# Patient Record
Sex: FEMALE | Race: WHITE | Employment: OTHER | URBAN - METROPOLITAN AREA
[De-identification: names, ages, dates, MRNs, and addresses within clinical notes are randomized per-mention and may not be internally consistent; named-entity substitution may affect disease eponyms.]

---

## 2020-01-18 ENCOUNTER — HOSPITAL ENCOUNTER (OUTPATIENT)
Age: 45
Discharge: HOME | End: 2020-01-18
Attending: FAMILY MEDICINE
Payer: COMMERCIAL

## 2020-01-18 VITALS
RESPIRATION RATE: 18 BRPM | HEART RATE: 81 BPM | OXYGEN SATURATION: 98 % | TEMPERATURE: 99 F | DIASTOLIC BLOOD PRESSURE: 67 MMHG | SYSTOLIC BLOOD PRESSURE: 112 MMHG

## 2020-01-18 DIAGNOSIS — Z20.818 STREPTOCOCCUS GROUP A EXPOSURE: Primary | ICD-10-CM

## 2020-01-18 DIAGNOSIS — R68.89 FLU-LIKE SYMPTOMS: ICD-10-CM

## 2020-01-18 LAB
POCT INFLUENZA A: NEGATIVE
POCT INFLUENZA B: NEGATIVE

## 2020-01-18 PROCEDURE — 87502 INFLUENZA DNA AMP PROBE: CPT | Performed by: FAMILY MEDICINE

## 2020-01-18 PROCEDURE — 99204 OFFICE O/P NEW MOD 45 MIN: CPT

## 2020-01-18 PROCEDURE — 87430 STREP A AG IA: CPT

## 2020-01-18 PROCEDURE — 99203 OFFICE O/P NEW LOW 30 MIN: CPT

## 2020-01-18 RX ORDER — FLUCONAZOLE 150 MG/1
150 TABLET ORAL ONCE
Qty: 1 TABLET | Refills: 0 | Status: SHIPPED | OUTPATIENT
Start: 2020-01-18 | End: 2020-01-18

## 2020-01-18 RX ORDER — AZITHROMYCIN 250 MG/1
TABLET, FILM COATED ORAL
Qty: 1 PACKAGE | Refills: 0 | Status: SHIPPED | OUTPATIENT
Start: 2020-01-18 | End: 2020-01-23

## 2020-01-18 NOTE — ED INITIAL ASSESSMENT (HPI)
Pt states son was sick last weekend with fever as high as 103. Pt son was diagnosed with strep. Pt states 2 nights ago began having a scratchy throat and a slight cough. Pt states began feeling fatigued and having fevers.

## 2020-01-18 NOTE — ED PROVIDER NOTES
Patient presents with:  Sore Throat  Fever      HPI:     Rolan Bhatti is a 38year old female who presents with for chief complaint of nasal congestion, sore throat, fever, fatigue. Started Thursday night. Son diagnosed with strep last week.    The p pulses. Exam reveals no gallop and no friction rub. No murmur heard. 4.RESPIRATORY/Pulmonary/Chest: Effort normal and breath sounds normal. No stridor. No respiratory distress. no wheezes. no rales. 5. GI: Abdominal: Soft.  Bowel sounds are normal. BETTER

## 2020-01-20 LAB — S PYO AG THROAT QL: NEGATIVE

## 2021-12-22 ENCOUNTER — IMMUNIZATION (OUTPATIENT)
Dept: LAB | Facility: HOSPITAL | Age: 46
End: 2021-12-22
Attending: EMERGENCY MEDICINE
Payer: COMMERCIAL

## 2021-12-22 DIAGNOSIS — Z23 NEED FOR VACCINATION: Primary | ICD-10-CM

## 2021-12-22 PROCEDURE — 0064A SARSCOV2 VAC 50MCG/0.25ML IM: CPT

## 2023-03-22 ENCOUNTER — HOSPITAL ENCOUNTER (OUTPATIENT)
Age: 48
Discharge: HOME | End: 2023-03-22
Payer: COMMERCIAL

## 2023-03-22 VITALS
OXYGEN SATURATION: 100 % | DIASTOLIC BLOOD PRESSURE: 56 MMHG | TEMPERATURE: 98 F | RESPIRATION RATE: 18 BRPM | SYSTOLIC BLOOD PRESSURE: 114 MMHG | HEART RATE: 81 BPM

## 2023-03-22 DIAGNOSIS — U07.1 COVID-19: Primary | ICD-10-CM

## 2023-03-22 DIAGNOSIS — R07.0 THROAT PAIN: ICD-10-CM

## 2023-03-22 DIAGNOSIS — Z20.818 EXPOSURE TO STREP THROAT: ICD-10-CM

## 2023-03-22 LAB — S PYO AG THROAT QL: NEGATIVE

## 2023-03-22 PROCEDURE — 99203 OFFICE O/P NEW LOW 30 MIN: CPT | Performed by: NURSE PRACTITIONER

## 2023-03-22 PROCEDURE — 87880 STREP A ASSAY W/OPTIC: CPT | Performed by: NURSE PRACTITIONER

## 2023-03-22 NOTE — ED INITIAL ASSESSMENT (HPI)
Pt here for strep testing due to exposure, pt states her son tested +strep x1 day ,pt also states she tested +covid x1 day, pt denies any sob

## 2023-12-06 ENCOUNTER — HOSPITAL ENCOUNTER (OUTPATIENT)
Age: 48
Discharge: HOME | End: 2023-12-06
Payer: COMMERCIAL

## 2023-12-06 VITALS
HEART RATE: 102 BPM | RESPIRATION RATE: 20 BRPM | TEMPERATURE: 98 F | DIASTOLIC BLOOD PRESSURE: 73 MMHG | SYSTOLIC BLOOD PRESSURE: 116 MMHG | OXYGEN SATURATION: 98 %

## 2023-12-06 DIAGNOSIS — J06.9 VIRAL URI WITH COUGH: Primary | ICD-10-CM

## 2023-12-06 LAB
S PYO AG THROAT QL: NEGATIVE
SARS-COV-2 RNA RESP QL NAA+PROBE: NOT DETECTED

## 2023-12-06 PROCEDURE — 99213 OFFICE O/P EST LOW 20 MIN: CPT | Performed by: NURSE PRACTITIONER

## 2023-12-06 PROCEDURE — U0002 COVID-19 LAB TEST NON-CDC: HCPCS | Performed by: NURSE PRACTITIONER

## 2023-12-06 PROCEDURE — 87880 STREP A ASSAY W/OPTIC: CPT | Performed by: NURSE PRACTITIONER

## 2023-12-06 NOTE — ED INITIAL ASSESSMENT (HPI)
Pt came in due to cough, congestion, and exposure to whooping cough. Pt has easy non labored respirations.